# Patient Record
Sex: FEMALE | Race: WHITE | NOT HISPANIC OR LATINO | URBAN - NONMETROPOLITAN AREA
[De-identification: names, ages, dates, MRNs, and addresses within clinical notes are randomized per-mention and may not be internally consistent; named-entity substitution may affect disease eponyms.]

---

## 2019-08-27 ENCOUNTER — APPOINTMENT (RX ONLY)
Dept: URBAN - NONMETROPOLITAN AREA CLINIC 13 | Facility: CLINIC | Age: 69
Setting detail: DERMATOLOGY
End: 2019-08-27

## 2019-08-27 DIAGNOSIS — L81.4 OTHER MELANIN HYPERPIGMENTATION: ICD-10-CM

## 2019-08-27 DIAGNOSIS — D22 MELANOCYTIC NEVI: ICD-10-CM

## 2019-08-27 DIAGNOSIS — L82.0 INFLAMED SEBORRHEIC KERATOSIS: ICD-10-CM

## 2019-08-27 DIAGNOSIS — L82.1 OTHER SEBORRHEIC KERATOSIS: ICD-10-CM

## 2019-08-27 DIAGNOSIS — L57.0 ACTINIC KERATOSIS: ICD-10-CM

## 2019-08-27 DIAGNOSIS — D18.0 HEMANGIOMA: ICD-10-CM

## 2019-08-27 PROBLEM — D22.5 MELANOCYTIC NEVI OF TRUNK: Status: ACTIVE | Noted: 2019-08-27

## 2019-08-27 PROBLEM — D18.01 HEMANGIOMA OF SKIN AND SUBCUTANEOUS TISSUE: Status: ACTIVE | Noted: 2019-08-27

## 2019-08-27 PROCEDURE — ? COUNSELING

## 2019-08-27 PROCEDURE — 17000 DESTRUCT PREMALG LESION: CPT | Mod: 59

## 2019-08-27 PROCEDURE — 17003 DESTRUCT PREMALG LES 2-14: CPT | Mod: 59

## 2019-08-27 PROCEDURE — 17110 DESTRUCTION B9 LES UP TO 14: CPT

## 2019-08-27 PROCEDURE — ? LIQUID NITROGEN

## 2019-08-27 PROCEDURE — 99214 OFFICE O/P EST MOD 30 MIN: CPT | Mod: 25

## 2019-08-27 ASSESSMENT — LOCATION DETAILED DESCRIPTION DERM
LOCATION DETAILED: LEFT INFERIOR MEDIAL FOREHEAD
LOCATION DETAILED: RIGHT MEDIAL UPPER BACK
LOCATION DETAILED: LEFT INFERIOR LATERAL NECK
LOCATION DETAILED: SUPERIOR THORACIC SPINE
LOCATION DETAILED: INFERIOR MID FOREHEAD
LOCATION DETAILED: EPIGASTRIC SKIN
LOCATION DETAILED: LEFT SUPERIOR MEDIAL UPPER BACK
LOCATION DETAILED: STERNAL NOTCH
LOCATION DETAILED: LEFT CLAVICULAR NECK
LOCATION DETAILED: MIDDLE STERNUM

## 2019-08-27 ASSESSMENT — LOCATION ZONE DERM
LOCATION ZONE: TRUNK
LOCATION ZONE: FACE
LOCATION ZONE: NECK

## 2019-08-27 ASSESSMENT — LOCATION SIMPLE DESCRIPTION DERM
LOCATION SIMPLE: LEFT ANTERIOR NECK
LOCATION SIMPLE: RIGHT UPPER BACK
LOCATION SIMPLE: LEFT FOREHEAD
LOCATION SIMPLE: CHEST
LOCATION SIMPLE: ABDOMEN
LOCATION SIMPLE: LEFT UPPER BACK
LOCATION SIMPLE: INFERIOR FOREHEAD
LOCATION SIMPLE: UPPER BACK

## 2019-08-27 ASSESSMENT — PAIN INTENSITY VAS: HOW INTENSE IS YOUR PAIN 0 BEING NO PAIN, 10 BEING THE MOST SEVERE PAIN POSSIBLE?: NO PAIN

## 2019-08-27 NOTE — PROCEDURE: LIQUID NITROGEN
Include Z78.9 (Other Specified Conditions Influencing Health Status) As An Associated Diagnosis?: No
Post-Care Instructions: I reviewed with the patient in detail post-care instructions. Patient is to wear sunprotection, and avoid picking at any of the treated lesions. Pt may apply Vaseline to crusted or scabbing areas.
Detail Level: Detailed
Consent: The patient's consent was obtained including but not limited to risks of crusting, scabbing, blistering, scarring, darker or lighter pigmentary change, recurrence, incomplete removal and infection.
Medical Necessity Clause: This procedure was medically necessary because the lesions that were treated were:
Medical Necessity Information: It is in your best interest to select a reason for this procedure from the list below. All of these items fulfill various CMS LCD requirements except the new and changing color options.
Number Of Freeze-Thaw Cycles: 2 freeze-thaw cycles
Duration Of Freeze Thaw-Cycle (Seconds): 3

## 2020-07-22 ENCOUNTER — APPOINTMENT (RX ONLY)
Dept: URBAN - NONMETROPOLITAN AREA CLINIC 15 | Facility: CLINIC | Age: 70
Setting detail: DERMATOLOGY
End: 2020-07-22

## 2020-07-22 DIAGNOSIS — L57.0 ACTINIC KERATOSIS: ICD-10-CM

## 2020-07-22 DIAGNOSIS — L85.3 XEROSIS CUTIS: ICD-10-CM

## 2020-07-22 DIAGNOSIS — L81.4 OTHER MELANIN HYPERPIGMENTATION: ICD-10-CM

## 2020-07-22 DIAGNOSIS — D18.0 HEMANGIOMA: ICD-10-CM

## 2020-07-22 DIAGNOSIS — D485 NEOPLASM OF UNCERTAIN BEHAVIOR OF SKIN: ICD-10-CM

## 2020-07-22 DIAGNOSIS — L82.1 OTHER SEBORRHEIC KERATOSIS: ICD-10-CM

## 2020-07-22 PROBLEM — D18.01 HEMANGIOMA OF SKIN AND SUBCUTANEOUS TISSUE: Status: ACTIVE | Noted: 2020-07-22

## 2020-07-22 PROBLEM — D48.5 NEOPLASM OF UNCERTAIN BEHAVIOR OF SKIN: Status: ACTIVE | Noted: 2020-07-22

## 2020-07-22 PROCEDURE — ? COUNSELING

## 2020-07-22 PROCEDURE — 99213 OFFICE O/P EST LOW 20 MIN: CPT | Mod: 25

## 2020-07-22 PROCEDURE — ? TREATMENT REGIMEN

## 2020-07-22 PROCEDURE — ? LIQUID NITROGEN

## 2020-07-22 PROCEDURE — 17000 DESTRUCT PREMALG LESION: CPT | Mod: 59

## 2020-07-22 PROCEDURE — 17003 DESTRUCT PREMALG LES 2-14: CPT

## 2020-07-22 PROCEDURE — 11440 EXC FACE-MM B9+MARG 0.5 CM/<: CPT

## 2020-07-22 PROCEDURE — ? PUNCH EXCISION

## 2020-07-22 ASSESSMENT — LOCATION SIMPLE DESCRIPTION DERM
LOCATION SIMPLE: LEFT SHOULDER
LOCATION SIMPLE: RIGHT CHEEK
LOCATION SIMPLE: RIGHT FOREHEAD
LOCATION SIMPLE: LEFT FOREHEAD
LOCATION SIMPLE: NOSE
LOCATION SIMPLE: LEFT UPPER BACK
LOCATION SIMPLE: RIGHT SHOULDER
LOCATION SIMPLE: LEFT CHEEK
LOCATION SIMPLE: UPPER BACK

## 2020-07-22 ASSESSMENT — LOCATION DETAILED DESCRIPTION DERM
LOCATION DETAILED: RIGHT CENTRAL MALAR CHEEK
LOCATION DETAILED: RIGHT POSTERIOR SHOULDER
LOCATION DETAILED: LEFT LATERAL MALAR CHEEK
LOCATION DETAILED: LEFT INFERIOR FOREHEAD
LOCATION DETAILED: LEFT INFERIOR CENTRAL MALAR CHEEK
LOCATION DETAILED: LEFT INFERIOR MEDIAL UPPER BACK
LOCATION DETAILED: INFERIOR THORACIC SPINE
LOCATION DETAILED: SUPERIOR THORACIC SPINE
LOCATION DETAILED: LEFT MEDIAL UPPER BACK
LOCATION DETAILED: NASAL ROOT
LOCATION DETAILED: LEFT POSTERIOR SHOULDER
LOCATION DETAILED: RIGHT INFERIOR MEDIAL FOREHEAD

## 2020-07-22 ASSESSMENT — LOCATION ZONE DERM
LOCATION ZONE: NOSE
LOCATION ZONE: FACE
LOCATION ZONE: ARM
LOCATION ZONE: TRUNK

## 2020-07-22 NOTE — PROCEDURE: PUNCH EXCISION
Medical Necessity Clause: This procedure was medically necessary because the lesion that was treated was:
Detail Level: Detailed
Size Of Lesion (*Required): 0.2
X Size Of Lesion Width In Cm (Optional): 0
Size Of Margin In Cm: 0.05
Punch Size In Mm: 4
Repair Type: None (Simple)
Complex Requirements: Extensive Undermining Performed?: No
Undermining Type: Entire Wound
Debridement Text: The wound edges were debrided prior to proceeding with the closure to facilitate wound healing.
Helical Rim Text: The closure involved the helical rim.
Vermilion Border Text: The closure involved the vermilion border.
Nostril Rim Text: The closure involved the nostril rim.
Retention Suture Text: Retention sutures were placed to support the closure and prevent dehiscence.
Anesthesia Type: 1% lidocaine with epinephrine
Anesthesia Volume In Cc: 1.5
Hemostasis: None
Epidermal Sutures: 5-0 Nylon
Number Of Epidermal Sutures (Optional): 3
Epidermal Closure: simple interrupted
Wound Care: Vaseline
Wound Dressings: a bandage
Suture Removal: 14 days
Lab: [id number]
Lab Facility: [id number]
Path Notes (To The Dermatopathologist): Please check margins.
1.5 Mm Punch Excision Text: A 1.5 mm punch biopsy was used to excise the lesion to the level of the subcutaneous fat.  Blunt dissection was used to free the lesion from the surrounding tissues and the lesion was removed.
2 Mm Punch Excision Text: A 2 mm punch biopsy was used to excise the lesion to the level of the subcutaneous fat.  Blunt dissection was used to free the lesion from the surrounding tissues and the lesion was removed.
2.5 Mm Punch Excision Text: A 2.5 mm punch biopsy was used to excise the lesion to the level of the subcutaneous fat.  Blunt dissection was used to free the lesion from the surrounding tissues and the lesion was removed.
3 Mm Punch Excision Text: A 3 mm punch biopsy was used to excise the lesion to the level of the subcutaneous fat.  Blunt dissection was used to free the lesion from the surrounding tissues and the lesion was removed.
3.5 Mm Punch Excision Text: A 3.5 mm punch biopsy was used to excise the lesion to the level of the subcutaneous fat.  Blunt dissection was used to free the lesion from the surrounding tissues and the lesion was removed.
4 Mm Punch Excision Text: A 4 mm punch biopsy was used to excise the lesion to the level of the subcutaneous fat.  Blunt dissection was used to free the lesion from the surrounding tissues and the lesion was removed.
4.5 Mm Punch Excision Text: A 4.5 mm punch biopsy was used to excise the lesion to the level of the subcutaneous fat.  Blunt dissection was used to free the lesion from the surrounding tissues and the lesion was removed.
5 Mm Punch Excision Text: A 5 mm punch biopsy was used to excise the lesion to the level of the subcutaneous fat.  Blunt dissection was used to free the lesion from the surrounding tissues and the lesion was removed.
6 Mm Punch Excision Text: A 6 mm punch biopsy was used to excise the lesion to the level of the subcutaneous fat.  Blunt dissection was used to free the lesion from the surrounding tissues and the lesion was removed.
7 Mm Punch Excision Text: A 7 mm punch biopsy was used to excise the lesion to the level of the subcutaneous fat.  Blunt dissection was used to free the lesion from the surrounding tissues and the lesion was removed.
8 Mm Punch Excision Text: A 8 mm punch biopsy was used to excise the lesion to the level of the subcutaneous fat.  Blunt dissection was used to free the lesion from the surrounding tissues and the lesion was removed.
10 Mm Punch Excision Text: A 10 mm punch biopsy was used to excise the lesion to the level of the subcutaneous fat.  Blunt dissection was used to free the lesion from the surrounding tissues and the lesion was removed.
12 Mm Punch Excision Text: A 12 mm punch biopsy was used to excise the lesion to the level of the subcutaneous fat.  Blunt dissection was used to free the lesion from the surrounding tissues and the lesion was removed.
Consent: Prior to the procedure, the risks and benefits to therapy were discussed, with the patient, in detail. Specifically, the risks of infection, scarring, bleeding, prolonged wound healing, incomplete removal, allergy to anesthesia, nerve injury and recurrence were addressed. The patient wished to proceed and provided verbal consent. The treatment site was clearly identified and confirmed by the patient. Additionally, a photograph of the lesion was taken and placed in the patient's chart.
Render Post-Care Instructions In Note?: yes
Post-Care Instructions: I reviewed with the patient in detail post-care instructions. Patient is to keep the biopsy site dry overnight, and then apply Vaseline twice daily until healed.
Notification Instructions: Patient will be notified of pathology results. However, patient instructed to call the office if not contacted, by this office, within 2 weeks.
Billing Type: Third-Party Bill

## 2020-07-22 NOTE — PROCEDURE: LIQUID NITROGEN
Duration Of Freeze Thaw-Cycle (Seconds): 0
Post-Care Instructions: I reviewed with the patient in detail post-care instructions. Patient is to wear sunprotection, and avoid picking at any of the treated lesions. Pt may apply Vaseline to crusted or scabbing areas.
Number Of Freeze-Thaw Cycles: 2 freeze-thaw cycles
Detail Level: Detailed
Render Note In Bullet Format When Appropriate: No
Consent: The patient's consent was obtained including but not limited to risks of crusting, scabbing, blistering, scarring, darker or lighter pigmentary change, recurrence, incomplete removal and infection.

## 2020-07-29 ENCOUNTER — APPOINTMENT (RX ONLY)
Dept: URBAN - NONMETROPOLITAN AREA CLINIC 15 | Facility: CLINIC | Age: 70
Setting detail: DERMATOLOGY
End: 2020-07-29

## 2020-07-29 DIAGNOSIS — Z48.02 ENCOUNTER FOR REMOVAL OF SUTURES: ICD-10-CM

## 2020-07-29 PROCEDURE — ? SUTURE REMOVAL (GLOBAL PERIOD)

## 2020-07-29 ASSESSMENT — LOCATION DETAILED DESCRIPTION DERM: LOCATION DETAILED: LEFT LATERAL MALAR CHEEK

## 2020-07-29 ASSESSMENT — LOCATION SIMPLE DESCRIPTION DERM: LOCATION SIMPLE: LEFT CHEEK

## 2020-07-29 ASSESSMENT — LOCATION ZONE DERM: LOCATION ZONE: FACE

## 2020-07-29 NOTE — PROCEDURE: SUTURE REMOVAL (GLOBAL PERIOD)
Detail Level: Detailed
Add 80097 Cpt? (Important Note: In 2017 The Use Of 41529 Is Being Tracked By Cms To Determine Future Global Period Reimbursement For Global Periods): no

## 2021-08-10 ENCOUNTER — APPOINTMENT (RX ONLY)
Dept: URBAN - NONMETROPOLITAN AREA CLINIC 15 | Facility: CLINIC | Age: 71
Setting detail: DERMATOLOGY
End: 2021-08-10

## 2021-08-10 DIAGNOSIS — D18.0 HEMANGIOMA: ICD-10-CM

## 2021-08-10 DIAGNOSIS — L81.4 OTHER MELANIN HYPERPIGMENTATION: ICD-10-CM

## 2021-08-10 DIAGNOSIS — L82.1 OTHER SEBORRHEIC KERATOSIS: ICD-10-CM

## 2021-08-10 DIAGNOSIS — Z85.828 PERSONAL HISTORY OF OTHER MALIGNANT NEOPLASM OF SKIN: ICD-10-CM

## 2021-08-10 DIAGNOSIS — L85.3 XEROSIS CUTIS: ICD-10-CM | Status: STABLE

## 2021-08-10 DIAGNOSIS — L72.0 EPIDERMAL CYST: ICD-10-CM

## 2021-08-10 PROBLEM — D18.01 HEMANGIOMA OF SKIN AND SUBCUTANEOUS TISSUE: Status: ACTIVE | Noted: 2021-08-10

## 2021-08-10 PROCEDURE — 99213 OFFICE O/P EST LOW 20 MIN: CPT

## 2021-08-10 PROCEDURE — ? COUNSELING

## 2021-08-10 PROCEDURE — ? TREATMENT REGIMEN

## 2021-08-10 PROCEDURE — ? SUNSCREEN RECOMMENDATIONS

## 2021-08-10 ASSESSMENT — LOCATION ZONE DERM
LOCATION ZONE: TRUNK
LOCATION ZONE: NECK
LOCATION ZONE: LEG
LOCATION ZONE: ARM
LOCATION ZONE: FACE

## 2021-08-10 ASSESSMENT — LOCATION DETAILED DESCRIPTION DERM
LOCATION DETAILED: RIGHT VENTRAL PROXIMAL FOREARM
LOCATION DETAILED: RIGHT PROXIMAL PRETIBIAL REGION
LOCATION DETAILED: LEFT DISTAL POSTERIOR UPPER ARM
LOCATION DETAILED: RIGHT MEDIAL UPPER BACK
LOCATION DETAILED: PERIUMBILICAL SKIN
LOCATION DETAILED: RIGHT DISTAL POSTERIOR UPPER ARM
LOCATION DETAILED: INFERIOR MID FOREHEAD
LOCATION DETAILED: SUPERIOR LUMBAR SPINE
LOCATION DETAILED: LEFT VENTRAL PROXIMAL FOREARM
LOCATION DETAILED: LEFT INFERIOR MEDIAL UPPER BACK
LOCATION DETAILED: UPPER STERNUM
LOCATION DETAILED: LEFT INFERIOR ANTERIOR NECK
LOCATION DETAILED: LEFT PROXIMAL PRETIBIAL REGION
LOCATION DETAILED: EPIGASTRIC SKIN
LOCATION DETAILED: SUPERIOR THORACIC SPINE

## 2021-08-10 ASSESSMENT — LOCATION SIMPLE DESCRIPTION DERM
LOCATION SIMPLE: INFERIOR FOREHEAD
LOCATION SIMPLE: RIGHT UPPER BACK
LOCATION SIMPLE: LOWER BACK
LOCATION SIMPLE: LEFT PRETIBIAL REGION
LOCATION SIMPLE: ABDOMEN
LOCATION SIMPLE: RIGHT PRETIBIAL REGION
LOCATION SIMPLE: RIGHT FOREARM
LOCATION SIMPLE: CHEST
LOCATION SIMPLE: LEFT ANTERIOR NECK
LOCATION SIMPLE: LEFT FOREARM
LOCATION SIMPLE: LEFT POSTERIOR UPPER ARM
LOCATION SIMPLE: RIGHT POSTERIOR UPPER ARM
LOCATION SIMPLE: LEFT UPPER BACK
LOCATION SIMPLE: UPPER BACK

## 2021-08-10 NOTE — PROCEDURE: MIPS QUALITY
Quality 47: Advance Care Plan: Advance Care Planning discussed and documented; advance care plan or surrogate decision maker documented in the medical record.
Quality 110: Preventive Care And Screening: Influenza Immunization: Influenza Immunization Administered during Influenza season
Detail Level: Detailed
Quality 134: Screening For Clinical Depression And Follow-Up Plan: The patient was screened for depression and the screen was negative and no follow up required
Quality 226: Preventive Care And Screening: Tobacco Use: Screening And Cessation Intervention: Patient screened for tobacco use and is an ex/non-smoker
Quality 111:Pneumonia Vaccination Status For Older Adults: Pneumococcal Vaccination Previously Received

## 2022-07-26 ENCOUNTER — APPOINTMENT (RX ONLY)
Dept: URBAN - NONMETROPOLITAN AREA CLINIC 15 | Facility: CLINIC | Age: 72
Setting detail: DERMATOLOGY
End: 2022-07-26

## 2022-07-26 DIAGNOSIS — D18.0 HEMANGIOMA: ICD-10-CM | Status: STABLE

## 2022-07-26 DIAGNOSIS — Z85.828 PERSONAL HISTORY OF OTHER MALIGNANT NEOPLASM OF SKIN: ICD-10-CM | Status: RESOLVED

## 2022-07-26 DIAGNOSIS — L81.4 OTHER MELANIN HYPERPIGMENTATION: ICD-10-CM | Status: STABLE

## 2022-07-26 DIAGNOSIS — L82.0 INFLAMED SEBORRHEIC KERATOSIS: ICD-10-CM

## 2022-07-26 DIAGNOSIS — L57.0 ACTINIC KERATOSIS: ICD-10-CM

## 2022-07-26 DIAGNOSIS — L72.0 EPIDERMAL CYST: ICD-10-CM | Status: WORSENING

## 2022-07-26 DIAGNOSIS — L82.1 OTHER SEBORRHEIC KERATOSIS: ICD-10-CM | Status: STABLE

## 2022-07-26 DIAGNOSIS — L85.3 XEROSIS CUTIS: ICD-10-CM | Status: STABLE

## 2022-07-26 PROBLEM — D18.01 HEMANGIOMA OF SKIN AND SUBCUTANEOUS TISSUE: Status: ACTIVE | Noted: 2022-07-26

## 2022-07-26 PROBLEM — D48.5 NEOPLASM OF UNCERTAIN BEHAVIOR OF SKIN: Status: ACTIVE | Noted: 2022-07-26

## 2022-07-26 PROCEDURE — 99213 OFFICE O/P EST LOW 20 MIN: CPT | Mod: 25

## 2022-07-26 PROCEDURE — ? CONSULTATION EXCISION

## 2022-07-26 PROCEDURE — ? COUNSELING

## 2022-07-26 PROCEDURE — 17110 DESTRUCTION B9 LES UP TO 14: CPT

## 2022-07-26 PROCEDURE — ? TREATMENT REGIMEN

## 2022-07-26 PROCEDURE — 17003 DESTRUCT PREMALG LES 2-14: CPT | Mod: 59

## 2022-07-26 PROCEDURE — ? SUNSCREEN RECOMMENDATIONS

## 2022-07-26 PROCEDURE — 17000 DESTRUCT PREMALG LESION: CPT | Mod: 59

## 2022-07-26 PROCEDURE — ? BIOPSY BY SHAVE METHOD

## 2022-07-26 PROCEDURE — ? LIQUID NITROGEN

## 2022-07-26 PROCEDURE — 11102 TANGNTL BX SKIN SINGLE LES: CPT | Mod: 59

## 2022-07-26 PROCEDURE — ? DEFER

## 2022-07-26 ASSESSMENT — LOCATION ZONE DERM
LOCATION ZONE: ARM
LOCATION ZONE: FACE
LOCATION ZONE: NOSE
LOCATION ZONE: NECK
LOCATION ZONE: TRUNK
LOCATION ZONE: LEG

## 2022-07-26 ASSESSMENT — LOCATION SIMPLE DESCRIPTION DERM
LOCATION SIMPLE: NOSE
LOCATION SIMPLE: LEFT CALF
LOCATION SIMPLE: LEFT SHOULDER
LOCATION SIMPLE: RIGHT ANTERIOR NECK
LOCATION SIMPLE: RIGHT POSTERIOR THIGH
LOCATION SIMPLE: RIGHT UPPER ARM
LOCATION SIMPLE: RIGHT FOREHEAD
LOCATION SIMPLE: ABDOMEN
LOCATION SIMPLE: RIGHT THIGH
LOCATION SIMPLE: LEFT UPPER BACK
LOCATION SIMPLE: LEFT CHEEK
LOCATION SIMPLE: LEFT FOREARM
LOCATION SIMPLE: UPPER BACK
LOCATION SIMPLE: LEFT THIGH
LOCATION SIMPLE: CHEST
LOCATION SIMPLE: LEFT CLAVICULAR SKIN
LOCATION SIMPLE: LEFT POSTERIOR THIGH
LOCATION SIMPLE: RIGHT LOWER BACK
LOCATION SIMPLE: RIGHT CHEEK
LOCATION SIMPLE: RIGHT FOREARM
LOCATION SIMPLE: RIGHT CALF

## 2022-07-26 ASSESSMENT — LOCATION DETAILED DESCRIPTION DERM
LOCATION DETAILED: RIGHT ANTERIOR DISTAL THIGH
LOCATION DETAILED: RIGHT INFERIOR ANTERIOR NECK
LOCATION DETAILED: NASAL DORSUM
LOCATION DETAILED: LEFT CLAVICULAR SKIN
LOCATION DETAILED: RIGHT PROXIMAL DORSAL FOREARM
LOCATION DETAILED: RIGHT ANTERIOR PROXIMAL UPPER ARM
LOCATION DETAILED: LOWER STERNUM
LOCATION DETAILED: RIGHT SUPERIOR MEDIAL FOREHEAD
LOCATION DETAILED: RIGHT DISTAL DORSAL FOREARM
LOCATION DETAILED: EPIGASTRIC SKIN
LOCATION DETAILED: RIGHT VENTRAL PROXIMAL FOREARM
LOCATION DETAILED: LEFT ANTERIOR SHOULDER
LOCATION DETAILED: RIGHT DISTAL POSTERIOR THIGH
LOCATION DETAILED: RIGHT INFERIOR CENTRAL MALAR CHEEK
LOCATION DETAILED: LEFT ANTERIOR DISTAL THIGH
LOCATION DETAILED: LEFT INFERIOR CENTRAL MALAR CHEEK
LOCATION DETAILED: INFERIOR THORACIC SPINE
LOCATION DETAILED: RIGHT INFERIOR MEDIAL MIDBACK
LOCATION DETAILED: LEFT INFERIOR MEDIAL UPPER BACK
LOCATION DETAILED: LEFT PROXIMAL DORSAL FOREARM
LOCATION DETAILED: LEFT DISTAL CALF
LOCATION DETAILED: RIGHT CENTRAL MALAR CHEEK
LOCATION DETAILED: LEFT DISTAL POSTERIOR THIGH
LOCATION DETAILED: UPPER STERNUM
LOCATION DETAILED: RIGHT PROXIMAL CALF
LOCATION DETAILED: LEFT VENTRAL PROXIMAL FOREARM

## 2022-07-26 NOTE — PROCEDURE: DEFER
Detail Level: Zone
Introduction Text (Please End With A Colon): The following procedure was deferred:
Procedure To Be Performed At Next Visit: Punch Excision
Instructions (Optional): Lesion measuring 1cm. Likely plan to use 12mm, w/4.0 suture.

## 2022-07-26 NOTE — PROCEDURE: MIPS QUALITY
Quality 226: Preventive Care And Screening: Tobacco Use: Screening And Cessation Intervention: Patient screened for tobacco use and is an ex/non-smoker
Quality 134: Screening For Clinical Depression And Follow-Up Plan: The patient was screened for depression and the screen was negative and no follow up required
Quality 111:Pneumonia Vaccination Status For Older Adults: Pneumococcal vaccine administered on or after patient’s 60th birthday and before the end of the measurement period
Detail Level: Detailed
Quality 110: Preventive Care And Screening: Influenza Immunization: Influenza Immunization Administered during Influenza season
Quality 431: Preventive Care And Screening: Unhealthy Alcohol Use - Screening: Patient not identified as an unhealthy alcohol user when screened for unhealthy alcohol use using a systematic screening method
Quality 47: Advance Care Plan: Advance Care Planning discussed and documented; advance care plan or surrogate decision maker documented in the medical record.

## 2022-07-26 NOTE — PROCEDURE: LIQUID NITROGEN
Consent: The patient's verbal consent was obtained including but not limited to risks of crusting, scabbing, blistering, scarring, darker or lighter pigmentary change, recurrence, incomplete removal and infection.
Add 52 Modifier (Optional): no
Spray Paint Text: The liquid nitrogen was applied to the skin utilizing a spray paint frosting technique.
Show Aperture Variable?: Yes
Medical Necessity Information: It is in your best interest to select a reason for this procedure from the list below. All of these items fulfill various CMS LCD requirements except the new and changing color options.
Medical Necessity Clause: This procedure was medically necessary because the lesions that were treated were:
Number Of Freeze-Thaw Cycles: 2 freeze-thaw cycles
Post-Care Instructions: I reviewed with the patient in detail post-care instructions. Patient is to wear sunprotection, and avoid picking at any of the treated lesions. Pt may apply Vaseline to crusted or scabbing areas.
Detail Level: Detailed
Duration Of Freeze Thaw-Cycle (Seconds): 0
Consent: The patient's consent was obtained including but not limited to risks of crusting, scabbing, blistering, scarring, darker or lighter pigmentary change, recurrence, incomplete removal and infection.

## 2022-07-26 NOTE — PROCEDURE: COUNSELING
Patient notified.  Patient verbalized understanding.  
Detail Level: Detailed
Detail Level: Zone
Detail Level: Generalized

## 2022-08-09 ENCOUNTER — APPOINTMENT (RX ONLY)
Dept: URBAN - NONMETROPOLITAN AREA CLINIC 15 | Facility: CLINIC | Age: 72
Setting detail: DERMATOLOGY
End: 2022-08-09

## 2022-08-09 DIAGNOSIS — L82.1 OTHER SEBORRHEIC KERATOSIS: ICD-10-CM | Status: STABLE

## 2022-08-09 DIAGNOSIS — Z85.828 PERSONAL HISTORY OF OTHER MALIGNANT NEOPLASM OF SKIN: ICD-10-CM | Status: RESOLVED

## 2022-08-09 DIAGNOSIS — L72.0 EPIDERMAL CYST: ICD-10-CM | Status: UNCHANGED

## 2022-08-09 PROBLEM — C44.319 BASAL CELL CARCINOMA OF SKIN OF OTHER PARTS OF FACE: Status: ACTIVE | Noted: 2022-08-09

## 2022-08-09 PROCEDURE — ? COUNSELING

## 2022-08-09 PROCEDURE — ? CONSULTATION EXCISION

## 2022-08-09 PROCEDURE — ? TREATMENT REGIMEN

## 2022-08-09 PROCEDURE — ? CURETTAGE AND DESTRUCTION

## 2022-08-09 PROCEDURE — ? CONSULTATION FOR ELECTRODESICCATION AND CURETTAGE

## 2022-08-09 PROCEDURE — ? PUNCH EXCISION

## 2022-08-09 PROCEDURE — 99213 OFFICE O/P EST LOW 20 MIN: CPT | Mod: 25

## 2022-08-09 PROCEDURE — 17280 DSTR MAL LS F/E/E/N/L/M .5/<: CPT

## 2022-08-09 PROCEDURE — 11401 EXC TR-EXT B9+MARG 0.6-1 CM: CPT

## 2022-08-09 ASSESSMENT — LOCATION DETAILED DESCRIPTION DERM
LOCATION DETAILED: LEFT INFERIOR MEDIAL UPPER BACK
LOCATION DETAILED: LOWER STERNUM
LOCATION DETAILED: LEFT MEDIAL FRONTAL SCALP

## 2022-08-09 ASSESSMENT — LOCATION ZONE DERM
LOCATION ZONE: SCALP
LOCATION ZONE: TRUNK

## 2022-08-09 ASSESSMENT — LOCATION SIMPLE DESCRIPTION DERM
LOCATION SIMPLE: LEFT SCALP
LOCATION SIMPLE: LEFT UPPER BACK
LOCATION SIMPLE: CHEST

## 2022-08-09 NOTE — PROCEDURE: PUNCH EXCISION
Size Of Lesion In Cm: 0.9
X Size Of Lesion In Cm (Optional): 0
Size Of Margin In Cm: 0.05
Excision Method: 10 mm Punch
Repair Type: None
Complex Requirements: Extensive Undermining Performed?: No
Undermining Type: Entire Wound
Debridement Text: The wound edges were debrided prior to proceeding with the closure to facilitate wound healing.
Helical Rim Text: The closure involved the helical rim.
Vermilion Border Text: The closure involved the vermilion border.
Nostril Rim Text: The closure involved the nostril rim.
Retention Suture Text: Retention sutures were placed to support the closure and prevent dehiscence.
Suture Removal: 14 days
Lab: [id number]
Lab Facility: [id number]
Detail Level: Detailed
Excision Depth: adipose tissue
Medical Necessity Clause: The excision was medically necessary because the lesion which was excised was
Anesthesia Type: 1% lidocaine without epinephrine
Hemostasis: Pressure
Estimated Blood Loss (Cc): minimal
Anesthesia Type: 1% lidocaine with epinephrine
Deep Sutures: 5-0 Vicryl
Epidermal Sutures: 4-0 Ethilon
Epidermal Closure: simple interrupted
Wound Care: Petrolatum
Dressing: dry sterile dressing
Complex Repair Preamble Text (Leave Blank If You Do Not Want): Extensive wide undermining was performed.
Intermediate Repair Preamble Text (Leave Blank If You Do Not Want): Undermining was performed with blunt dissection.
1.5 Mm Punch Excision Text: A 1.5 mm punch was used to make an initial incision over the lesion.  After this overlying column of skin was removed, blunt dissection was used to free the lesion from the surrounding tissues and the lesion was extirpated through the surgical opening made by the punch biopsy.
2 Mm Punch Excision Text: A 2 mm punch was used to make an initial incision over the lesion.  After this overlying column of skin was removed, blunt dissection was used to free the lesion from the surrounding tissues and the lesion was extirpated through the surgical opening made by the punch biopsy.
2.5 Mm Punch Excision Text: A 2.5 mm punch was used to make an initial incision over the lesion.  After this overlying column of skin was removed, blunt dissection was used to free the lesion from the surrounding tissues and the lesion was extirpated through the surgical opening made by the punch biopsy.
3 Mm Punch Excision Text: A 3 mm punch was used to make an initial incision over the lesion.  After this overlying column of skin was removed, blunt dissection was used to free the lesion from the surrounding tissues and the lesion was extirpated through the surgical opening made by the punch biopsy.
3.5 Mm Punch Excision Text: A 3.5 mm punch was used to make an initial incision over the lesion.  After this overlying column of skin was removed, blunt dissection was used to free the lesion from the surrounding tissues and the lesion was extirpated through the surgical opening made by the punch biopsy.
4 Mm Punch Excision Text: A 4 mm punch was used to make an initial incision over the lesion.  After this overlying column of skin was removed, blunt dissection was used to free the lesion from the surrounding tissues and the lesion was extirpated through the surgical opening made by the punch biopsy.
4.5 Mm Punch Excision Text: A 4.5 mm punch was used to make an initial incision over the lesion.  After this overlying column of skin was removed, blunt dissection was used to free the lesion from the surrounding tissues and the lesion was extirpated through the surgical opening made by the punch biopsy.
5 Mm Punch Excision Text: A 5 mm punch was used to make an initial incision over the lesion.  After this overlying column of skin was removed, blunt dissection was used to free the lesion from the surrounding tissues and the lesion was extirpated through the surgical opening made by the punch biopsy.
6 Mm Punch Excision Text: A 6 mm punch was used to make an initial incision over the lesion.  After this overlying column of skin was removed, blunt dissection was used to free the lesion from the surrounding tissues and the lesion was extirpated through the surgical opening made by the punch biopsy.
7 Mm Punch Excision Text: A 7 mm punch was used to make an initial incision over the lesion.  After this overlying column of skin was removed, blunt dissection was used to free the lesion from the surrounding tissues and the lesion was extirpated through the surgical opening made by the punch biopsy.
8 Mm Punch Excision Text: A 8 mm punch was used to make an initial incision over the lesion.  After this overlying column of skin was removed, blunt dissection was used to free the lesion from the surrounding tissues and the lesion was extirpated through the surgical opening made by the punch biopsy.
10 Mm Punch Excision Text: A 10 mm punch was used to make an initial incision over the lesion.  After this overlying column of skin was removed, blunt dissection was used to free the lesion from the surrounding tissues and the lesion was extirpated through the surgical opening made by the punch biopsy.
12 Mm Punch Excision Text: A 12 mm punch was used to make an initial incision over the lesion.  After this overlying column of skin was removed, blunt dissection was used to free the lesion from the surrounding tissues and the lesion was extirpated through the surgical opening made by the punch biopsy.
Purse String (Intermediate) Text: Given the location of the defect and the characteristics of the surrounding skin a purse string intermediate closure was deemed most appropriate.  Undermining was performed circumfirentially around the surgical defect.  A purse string suture was then placed and tightened.
Path Notes (To The Dermatopathologist): Please check margins.
Medical Necessity Clause: This procedure was medically necessary because the lesion that was treated was:
Consent: Verbal consent was obtained from the patient. The risks and benefits to therapy were discussed in detail, including risks of infection, scarring, bleeding, prolonged wound healing, incomplete removal, allergy to anesthesia, nerve injury and recurrence were addressed. Prior to the procedure, the treatment site was clearly identified and confirmed by the patient. All components of Universal Protocol/PAUSE Rule completed.
Render Post-Care Instructions In Note?: yes
Post-Care Instructions: I reviewed with the patient in detail post-care instructions. Patient is not to engage in any heavy lifting, exercise, or swimming for the next 14 days. Should the patient develop any fevers, chills, bleeding, severe pain patient will contact the office immediately.
Billing Type: Third-Party Bill

## 2022-08-09 NOTE — PROCEDURE: CURETTAGE AND DESTRUCTION
Detail Level: Detailed
Biopsy Photograph Reviewed: Yes
Number Of Curettages: 2
Size Of Lesion In Cm: 0.3
Size Of Lesion After Curettage: 0.5
Add Intralesional Injection: No
Concentration (Mg/Ml Or Millions Of Plaque Forming Units/Cc): 0.01
Total Volume (Ccs): 1
Anesthesia Type: 1% lidocaine with epinephrine
Anesthesia Volume In Cc: 1.5
Cautery Type: electrodesiccation
What Was Performed First?: Curettage
Final Size Statement: The size of the lesion after curettage was
Additional Information: (Optional): The wound was cleaned, and a dressing was applied.  The patient received detailed post-op instructions.
Consent: Verbal consent was obtained from the patient. The risks, benefits and alternatives to therapy were discussed in detail. Specifically, the risks of infection, scarring, bleeding, prolonged wound healing, nerve injury, incomplete removal, allergy to anesthesia and recurrence were addressed. Alternatives to ED&C, such as: surgical removal and XRT were also discussed.  Prior to the procedure, the treatment site was clearly identified and confirmed by the patient.
Post-Care Instructions: I reviewed with the patient in detail post-care instruction, including keeping the area clean with antibacterial soap and water and application of Vaseline or Aquaphor. Patient should not engage in any swimming until the area is healed. Should the patient develop any fevers, chills, bleeding, severe pain patient will contact the office immediately.
Bill As A Line Item Or As Units: Line Item

## 2022-08-23 ENCOUNTER — APPOINTMENT (RX ONLY)
Dept: URBAN - NONMETROPOLITAN AREA CLINIC 15 | Facility: CLINIC | Age: 72
Setting detail: DERMATOLOGY
End: 2022-08-23

## 2022-08-23 ENCOUNTER — APPOINTMENT (RX ONLY)
Dept: URBAN - NONMETROPOLITAN AREA CLINIC 13 | Facility: CLINIC | Age: 72
Setting detail: DERMATOLOGY
End: 2022-08-23

## 2022-08-23 DIAGNOSIS — Z48.02 ENCOUNTER FOR REMOVAL OF SUTURES: ICD-10-CM | Status: STABLE

## 2022-08-23 PROCEDURE — ? SUTURE REMOVAL (NO GLOBAL PERIOD)

## 2022-08-23 ASSESSMENT — LOCATION DETAILED DESCRIPTION DERM: LOCATION DETAILED: LOWER STERNUM

## 2022-08-23 ASSESSMENT — LOCATION ZONE DERM: LOCATION ZONE: TRUNK

## 2022-08-23 ASSESSMENT — LOCATION SIMPLE DESCRIPTION DERM: LOCATION SIMPLE: CHEST

## 2022-11-09 ENCOUNTER — APPOINTMENT (RX ONLY)
Dept: URBAN - NONMETROPOLITAN AREA CLINIC 15 | Facility: CLINIC | Age: 72
Setting detail: DERMATOLOGY
End: 2022-11-09

## 2022-11-09 DIAGNOSIS — L82.1 OTHER SEBORRHEIC KERATOSIS: ICD-10-CM | Status: STABLE

## 2022-11-09 DIAGNOSIS — L57.0 ACTINIC KERATOSIS: ICD-10-CM | Status: INADEQUATELY CONTROLLED

## 2022-11-09 DIAGNOSIS — Z85.828 PERSONAL HISTORY OF OTHER MALIGNANT NEOPLASM OF SKIN: ICD-10-CM

## 2022-11-09 DIAGNOSIS — L81.8 OTHER SPECIFIED DISORDERS OF PIGMENTATION: ICD-10-CM

## 2022-11-09 PROCEDURE — ? MEDICATION COUNSELING

## 2022-11-09 PROCEDURE — ? PRESCRIPTION

## 2022-11-09 PROCEDURE — ? TREATMENT REGIMEN

## 2022-11-09 PROCEDURE — ? COUNSELING

## 2022-11-09 PROCEDURE — 99213 OFFICE O/P EST LOW 20 MIN: CPT

## 2022-11-09 ASSESSMENT — SEVERITY ASSESSMENT: SEVERITY: MILD

## 2022-11-09 ASSESSMENT — LOCATION SIMPLE DESCRIPTION DERM
LOCATION SIMPLE: LEFT ANTERIOR NECK
LOCATION SIMPLE: LEFT UPPER BACK
LOCATION SIMPLE: LEFT FOREHEAD

## 2022-11-09 ASSESSMENT — LOCATION ZONE DERM
LOCATION ZONE: NECK
LOCATION ZONE: FACE
LOCATION ZONE: TRUNK

## 2022-11-09 ASSESSMENT — LOCATION DETAILED DESCRIPTION DERM
LOCATION DETAILED: LEFT INFERIOR MEDIAL UPPER BACK
LOCATION DETAILED: LEFT SUPERIOR FOREHEAD
LOCATION DETAILED: LEFT SUPERIOR ANTERIOR NECK

## 2022-11-09 NOTE — PROCEDURE: TREATMENT REGIMEN
Detail Level: Detailed
Initiate Treatment: 4FU/Vitamin D compound : apply thin amount to face, ears, and neck BID x 5 days then stop.
Plan: Patient to start treatment with 5FU/Vitamin D compound after [place].

## 2023-01-11 ENCOUNTER — APPOINTMENT (RX ONLY)
Dept: URBAN - NONMETROPOLITAN AREA CLINIC 15 | Facility: CLINIC | Age: 73
Setting detail: DERMATOLOGY
End: 2023-01-11

## 2023-01-11 DIAGNOSIS — Z85.828 PERSONAL HISTORY OF OTHER MALIGNANT NEOPLASM OF SKIN: ICD-10-CM | Status: RESOLVED

## 2023-01-11 DIAGNOSIS — L24.4 IRRITANT CONTACT DERMATITIS DUE TO DRUGS IN CONTACT WITH SKIN: ICD-10-CM | Status: IMPROVED

## 2023-01-11 PROCEDURE — ? COUNSELING

## 2023-01-11 PROCEDURE — 99213 OFFICE O/P EST LOW 20 MIN: CPT

## 2023-01-11 PROCEDURE — ? TREATMENT REGIMEN

## 2023-01-11 ASSESSMENT — LOCATION DETAILED DESCRIPTION DERM
LOCATION DETAILED: RIGHT SUPERIOR HELIX
LOCATION DETAILED: LEFT SUPERIOR HELIX
LOCATION DETAILED: RIGHT INFERIOR MEDIAL FOREHEAD
LOCATION DETAILED: LEFT SUPERIOR FOREHEAD
LOCATION DETAILED: LEFT INFERIOR MEDIAL UPPER BACK

## 2023-01-11 ASSESSMENT — SEVERITY ASSESSMENT 2020: SEVERITY 2020: MILD

## 2023-01-11 ASSESSMENT — LOCATION SIMPLE DESCRIPTION DERM
LOCATION SIMPLE: LEFT FOREHEAD
LOCATION SIMPLE: LEFT UPPER BACK
LOCATION SIMPLE: RIGHT EAR
LOCATION SIMPLE: LEFT EAR
LOCATION SIMPLE: RIGHT FOREHEAD

## 2023-01-11 ASSESSMENT — LOCATION ZONE DERM
LOCATION ZONE: EAR
LOCATION ZONE: TRUNK
LOCATION ZONE: FACE

## 2023-07-26 ENCOUNTER — APPOINTMENT (RX ONLY)
Dept: URBAN - NONMETROPOLITAN AREA CLINIC 39 | Facility: CLINIC | Age: 73
Setting detail: DERMATOLOGY
End: 2023-07-26

## 2023-07-26 DIAGNOSIS — D18.0 HEMANGIOMA: ICD-10-CM | Status: STABLE

## 2023-07-26 DIAGNOSIS — L81.4 OTHER MELANIN HYPERPIGMENTATION: ICD-10-CM | Status: STABLE

## 2023-07-26 DIAGNOSIS — L82.1 OTHER SEBORRHEIC KERATOSIS: ICD-10-CM | Status: STABLE

## 2023-07-26 DIAGNOSIS — T1490XA CONTUSION OF UNSPECIFIED SITE: ICD-10-CM | Status: STABLE

## 2023-07-26 DIAGNOSIS — L85.3 XEROSIS CUTIS: ICD-10-CM

## 2023-07-26 DIAGNOSIS — Z85.828 PERSONAL HISTORY OF OTHER MALIGNANT NEOPLASM OF SKIN: ICD-10-CM | Status: RESOLVED

## 2023-07-26 DIAGNOSIS — L57.0 ACTINIC KERATOSIS: ICD-10-CM

## 2023-07-26 PROBLEM — D18.01 HEMANGIOMA OF SKIN AND SUBCUTANEOUS TISSUE: Status: ACTIVE | Noted: 2023-07-26

## 2023-07-26 PROBLEM — S80.02XA CONTUSION OF LEFT KNEE, INITIAL ENCOUNTER: Status: ACTIVE | Noted: 2023-07-26

## 2023-07-26 PROCEDURE — 99213 OFFICE O/P EST LOW 20 MIN: CPT | Mod: 25

## 2023-07-26 PROCEDURE — 17003 DESTRUCT PREMALG LES 2-14: CPT

## 2023-07-26 PROCEDURE — ? SUNSCREEN RECOMMENDATIONS

## 2023-07-26 PROCEDURE — ? LIQUID NITROGEN

## 2023-07-26 PROCEDURE — ? TREATMENT REGIMEN

## 2023-07-26 PROCEDURE — ? COUNSELING

## 2023-07-26 PROCEDURE — 17000 DESTRUCT PREMALG LESION: CPT

## 2023-07-26 ASSESSMENT — LOCATION DETAILED DESCRIPTION DERM
LOCATION DETAILED: LEFT INFERIOR CENTRAL MALAR CHEEK
LOCATION DETAILED: RIGHT PROXIMAL DORSAL FOREARM
LOCATION DETAILED: RIGHT PROXIMAL CALF
LOCATION DETAILED: LEFT LATERAL KNEE
LOCATION DETAILED: LEFT VENTRAL PROXIMAL FOREARM
LOCATION DETAILED: RIGHT DISTAL POSTERIOR THIGH
LOCATION DETAILED: LEFT INFERIOR MEDIAL UPPER BACK
LOCATION DETAILED: LEFT ANTERIOR SHOULDER
LOCATION DETAILED: RIGHT ANTERIOR PROXIMAL UPPER ARM
LOCATION DETAILED: LEFT SUPERIOR FOREHEAD
LOCATION DETAILED: EPIGASTRIC SKIN
LOCATION DETAILED: RIGHT INFERIOR MEDIAL MIDBACK
LOCATION DETAILED: RIGHT NASAL DORSUM
LOCATION DETAILED: LEFT DISTAL POSTERIOR THIGH
LOCATION DETAILED: INFERIOR THORACIC SPINE
LOCATION DETAILED: LEFT DISTAL CALF
LOCATION DETAILED: LEFT ANTERIOR DISTAL THIGH
LOCATION DETAILED: RIGHT VENTRAL PROXIMAL FOREARM
LOCATION DETAILED: RIGHT INFERIOR ANTERIOR NECK
LOCATION DETAILED: LEFT PROXIMAL DORSAL FOREARM
LOCATION DETAILED: UPPER STERNUM
LOCATION DETAILED: RIGHT ANTERIOR DISTAL THIGH

## 2023-07-26 ASSESSMENT — LOCATION SIMPLE DESCRIPTION DERM
LOCATION SIMPLE: RIGHT LOWER BACK
LOCATION SIMPLE: LEFT UPPER BACK
LOCATION SIMPLE: ABDOMEN
LOCATION SIMPLE: LEFT SHOULDER
LOCATION SIMPLE: NOSE
LOCATION SIMPLE: RIGHT FOREARM
LOCATION SIMPLE: RIGHT UPPER ARM
LOCATION SIMPLE: RIGHT POSTERIOR THIGH
LOCATION SIMPLE: LEFT THIGH
LOCATION SIMPLE: LEFT KNEE
LOCATION SIMPLE: RIGHT ANTERIOR NECK
LOCATION SIMPLE: LEFT POSTERIOR THIGH
LOCATION SIMPLE: LEFT CHEEK
LOCATION SIMPLE: LEFT FOREHEAD
LOCATION SIMPLE: RIGHT THIGH
LOCATION SIMPLE: LEFT FOREARM
LOCATION SIMPLE: RIGHT CALF
LOCATION SIMPLE: LEFT CALF
LOCATION SIMPLE: UPPER BACK
LOCATION SIMPLE: CHEST

## 2023-07-26 ASSESSMENT — LOCATION ZONE DERM
LOCATION ZONE: FACE
LOCATION ZONE: ARM
LOCATION ZONE: LEG
LOCATION ZONE: NECK
LOCATION ZONE: TRUNK
LOCATION ZONE: NOSE

## 2023-07-26 NOTE — PROCEDURE: LIQUID NITROGEN
Show Applicator Variable?: Yes
Number Of Freeze-Thaw Cycles: 2 freeze-thaw cycles
Render Post-Care Instructions In Note?: no
Duration Of Freeze Thaw-Cycle (Seconds): 0
Detail Level: Detailed
Consent: The patient's consent was obtained including but not limited to risks of crusting, scabbing, blistering, scarring, darker or lighter pigmentary change, recurrence, incomplete removal and infection.
Post-Care Instructions: I reviewed with the patient in detail post-care instructions. Patient is to wear sunprotection, and avoid picking at any of the treated lesions. Pt may apply Vaseline to crusted or scabbing areas.

## 2024-07-22 ENCOUNTER — APPOINTMENT (RX ONLY)
Dept: URBAN - NONMETROPOLITAN AREA CLINIC 39 | Facility: CLINIC | Age: 74
Setting detail: DERMATOLOGY
End: 2024-07-22

## 2024-07-22 DIAGNOSIS — D18.0 HEMANGIOMA: ICD-10-CM | Status: STABLE

## 2024-07-22 DIAGNOSIS — Z85.828 PERSONAL HISTORY OF OTHER MALIGNANT NEOPLASM OF SKIN: ICD-10-CM | Status: RESOLVED

## 2024-07-22 DIAGNOSIS — L82.1 OTHER SEBORRHEIC KERATOSIS: ICD-10-CM | Status: STABLE

## 2024-07-22 DIAGNOSIS — I83.9 ASYMPTOMATIC VARICOSE VEINS OF LOWER EXTREMITIES: ICD-10-CM | Status: STABLE

## 2024-07-22 PROBLEM — I83.93 ASYMPTOMATIC VARICOSE VEINS OF BILATERAL LOWER EXTREMITIES: Status: ACTIVE | Noted: 2024-07-22

## 2024-07-22 PROBLEM — D18.01 HEMANGIOMA OF SKIN AND SUBCUTANEOUS TISSUE: Status: ACTIVE | Noted: 2024-07-22

## 2024-07-22 PROCEDURE — 99213 OFFICE O/P EST LOW 20 MIN: CPT

## 2024-07-22 PROCEDURE — ? COUNSELING

## 2024-07-22 PROCEDURE — G2211 COMPLEX E/M VISIT ADD ON: HCPCS

## 2024-07-22 PROCEDURE — ? VISIT COMPLEXITY

## 2024-07-22 ASSESSMENT — LOCATION DETAILED DESCRIPTION DERM
LOCATION DETAILED: LEFT PROXIMAL PRETIBIAL REGION
LOCATION DETAILED: RIGHT INFERIOR MEDIAL MIDBACK
LOCATION DETAILED: LEFT DISTAL CALF
LOCATION DETAILED: RIGHT PROXIMAL PRETIBIAL REGION
LOCATION DETAILED: LEFT SUPERIOR FOREHEAD
LOCATION DETAILED: PERIUMBILICAL SKIN
LOCATION DETAILED: RIGHT SUPERIOR MEDIAL UPPER BACK
LOCATION DETAILED: LEFT INFERIOR MEDIAL UPPER BACK
LOCATION DETAILED: RIGHT PROXIMAL CALF

## 2024-07-22 ASSESSMENT — LOCATION SIMPLE DESCRIPTION DERM
LOCATION SIMPLE: RIGHT PRETIBIAL REGION
LOCATION SIMPLE: LEFT PRETIBIAL REGION
LOCATION SIMPLE: RIGHT UPPER BACK
LOCATION SIMPLE: LEFT CALF
LOCATION SIMPLE: LEFT UPPER BACK
LOCATION SIMPLE: LEFT FOREHEAD
LOCATION SIMPLE: RIGHT CALF
LOCATION SIMPLE: RIGHT LOWER BACK
LOCATION SIMPLE: ABDOMEN

## 2024-07-22 ASSESSMENT — LOCATION ZONE DERM
LOCATION ZONE: TRUNK
LOCATION ZONE: LEG
LOCATION ZONE: FACE

## 2025-07-30 ENCOUNTER — APPOINTMENT (OUTPATIENT)
Dept: URBAN - NONMETROPOLITAN AREA CLINIC 39 | Facility: CLINIC | Age: 75
Setting detail: DERMATOLOGY
End: 2025-07-30

## 2025-07-30 DIAGNOSIS — Z85.828 PERSONAL HISTORY OF OTHER MALIGNANT NEOPLASM OF SKIN: ICD-10-CM | Status: RESOLVED

## 2025-07-30 DIAGNOSIS — L81.4 OTHER MELANIN HYPERPIGMENTATION: ICD-10-CM | Status: STABLE

## 2025-07-30 DIAGNOSIS — L57.0 ACTINIC KERATOSIS: ICD-10-CM

## 2025-07-30 DIAGNOSIS — L21.8 OTHER SEBORRHEIC DERMATITIS: ICD-10-CM | Status: WORSENING

## 2025-07-30 DIAGNOSIS — L82.1 OTHER SEBORRHEIC KERATOSIS: ICD-10-CM | Status: STABLE

## 2025-07-30 PROCEDURE — ? LIQUID NITROGEN

## 2025-07-30 PROCEDURE — ? COUNSELING

## 2025-07-30 PROCEDURE — ? SUNSCREEN RECOMMENDATIONS

## 2025-07-30 PROCEDURE — ? PRESCRIPTION

## 2025-07-30 PROCEDURE — ? MEDICATION COUNSELING

## 2025-07-30 PROCEDURE — ? PRESCRIPTION MEDICATION MANAGEMENT

## 2025-07-30 RX ORDER — HYDROCORTISONE 25 MG/G
OINTMENT TOPICAL
Qty: 28.35 | Refills: 0 | Status: ERX | COMMUNITY
Start: 2025-07-30

## 2025-07-30 RX ADMIN — HYDROCORTISONE: 25 OINTMENT TOPICAL at 00:00

## 2025-07-30 ASSESSMENT — LOCATION DETAILED DESCRIPTION DERM
LOCATION DETAILED: NASAL DORSUM
LOCATION DETAILED: RIGHT PROXIMAL DORSAL FOREARM
LOCATION DETAILED: RIGHT TRAGUS
LOCATION DETAILED: LEFT SUPERIOR FOREHEAD
LOCATION DETAILED: LEFT CAVUM CONCHA
LOCATION DETAILED: LEFT PROXIMAL PRETIBIAL REGION
LOCATION DETAILED: LEFT INFERIOR FOREHEAD
LOCATION DETAILED: RIGHT PROXIMAL PRETIBIAL REGION
LOCATION DETAILED: LEFT INFERIOR MEDIAL UPPER BACK
LOCATION DETAILED: EPIGASTRIC SKIN
LOCATION DETAILED: UPPER STERNUM
LOCATION DETAILED: LEFT PROXIMAL DORSAL FOREARM

## 2025-07-30 ASSESSMENT — LOCATION SIMPLE DESCRIPTION DERM
LOCATION SIMPLE: CHEST
LOCATION SIMPLE: NOSE
LOCATION SIMPLE: LEFT FOREHEAD
LOCATION SIMPLE: LEFT UPPER BACK
LOCATION SIMPLE: RIGHT FOREARM
LOCATION SIMPLE: LEFT EAR
LOCATION SIMPLE: RIGHT EAR
LOCATION SIMPLE: RIGHT PRETIBIAL REGION
LOCATION SIMPLE: LEFT FOREARM
LOCATION SIMPLE: LEFT PRETIBIAL REGION
LOCATION SIMPLE: ABDOMEN

## 2025-07-30 ASSESSMENT — LOCATION ZONE DERM
LOCATION ZONE: NOSE
LOCATION ZONE: TRUNK
LOCATION ZONE: FACE
LOCATION ZONE: LEG
LOCATION ZONE: ARM
LOCATION ZONE: EAR

## 2025-07-30 ASSESSMENT — SEVERITY ASSESSMENT: HOW SEVERE IS THIS PATIENT'S CONDITION?: MODERATE
